# Patient Record
Sex: MALE | Race: WHITE | NOT HISPANIC OR LATINO | Employment: FULL TIME | URBAN - METROPOLITAN AREA
[De-identification: names, ages, dates, MRNs, and addresses within clinical notes are randomized per-mention and may not be internally consistent; named-entity substitution may affect disease eponyms.]

---

## 2019-02-15 PROBLEM — Z80.0 FAMILY HISTORY OF COLON CANCER: Status: ACTIVE | Noted: 2019-02-15

## 2023-04-04 ENCOUNTER — TELEPHONE (OUTPATIENT)
Dept: HEMATOLOGY/ONCOLOGY | Facility: CLINIC | Age: 68
End: 2023-04-04
Payer: COMMERCIAL

## 2023-04-04 NOTE — NURSING
Patient notified of the scheduled appointment with Dr. Joiner for  04/06/23.  Patient will bring images on a disc to appointment.  Referring provider notified of the scheduled appointment.  Records uploaded to .  Provided patient with my call back information.  Oncology Navigation   Intake  Cancer Type: Thoracic  Internal / External Referral: External  Date of Referral: 03/30/23  Initial Nurse Navigator Contact: 04/04/23  Referral to Initial Contact Timeline (days): 5  Date Worked: 04/04/23  First Appointment Available: 04/06/23  Appointment Date: 04/06/23  First Available Date vs. Scheduled Date (days): 0  Multiple appointments: No  Reason if booked > 7 days after scheduling: Specific provider / access     Treatment  Current Status: Active    Surgery: Planned  Surgical Oncologist: Marisel  Consult Date: 04/06/23          Procedures: CT; PET scan  PET Scan Schedule Date: 03/01/23                 Acuity  Stage: 1   Needed: 0  Verbalizes Financial Concerns: 0  Transportation: 0  History of noncompliance/frequent no shows and cancellations: 0  Verbalizes the need for more education: 1  Navigation Acuity: 3     Follow Up  No follow-ups on file.

## 2023-04-06 ENCOUNTER — OFFICE VISIT (OUTPATIENT)
Dept: CARDIOTHORACIC SURGERY | Facility: CLINIC | Age: 68
End: 2023-04-06
Payer: COMMERCIAL

## 2023-04-06 VITALS
OXYGEN SATURATION: 91 % | HEIGHT: 74 IN | SYSTOLIC BLOOD PRESSURE: 142 MMHG | BODY MASS INDEX: 24.67 KG/M2 | WEIGHT: 192.25 LBS | DIASTOLIC BLOOD PRESSURE: 81 MMHG | HEART RATE: 66 BPM

## 2023-04-06 DIAGNOSIS — R91.1 LUNG NODULE: Primary | ICD-10-CM

## 2023-04-06 PROCEDURE — 3077F PR MOST RECENT SYSTOLIC BLOOD PRESSURE >= 140 MM HG: ICD-10-PCS | Mod: CPTII,S$GLB,, | Performed by: STUDENT IN AN ORGANIZED HEALTH CARE EDUCATION/TRAINING PROGRAM

## 2023-04-06 PROCEDURE — 1126F PR PAIN SEVERITY QUANTIFIED, NO PAIN PRESENT: ICD-10-PCS | Mod: CPTII,S$GLB,, | Performed by: STUDENT IN AN ORGANIZED HEALTH CARE EDUCATION/TRAINING PROGRAM

## 2023-04-06 PROCEDURE — 4010F PR ACE/ARB THEARPY RXD/TAKEN: ICD-10-PCS | Mod: CPTII,S$GLB,, | Performed by: STUDENT IN AN ORGANIZED HEALTH CARE EDUCATION/TRAINING PROGRAM

## 2023-04-06 PROCEDURE — 99999 PR PBB SHADOW E&M-EST. PATIENT-LVL III: CPT | Mod: PBBFAC,,, | Performed by: STUDENT IN AN ORGANIZED HEALTH CARE EDUCATION/TRAINING PROGRAM

## 2023-04-06 PROCEDURE — 4010F ACE/ARB THERAPY RXD/TAKEN: CPT | Mod: CPTII,S$GLB,, | Performed by: STUDENT IN AN ORGANIZED HEALTH CARE EDUCATION/TRAINING PROGRAM

## 2023-04-06 PROCEDURE — 3008F PR BODY MASS INDEX (BMI) DOCUMENTED: ICD-10-PCS | Mod: CPTII,S$GLB,, | Performed by: STUDENT IN AN ORGANIZED HEALTH CARE EDUCATION/TRAINING PROGRAM

## 2023-04-06 PROCEDURE — 3079F DIAST BP 80-89 MM HG: CPT | Mod: CPTII,S$GLB,, | Performed by: STUDENT IN AN ORGANIZED HEALTH CARE EDUCATION/TRAINING PROGRAM

## 2023-04-06 PROCEDURE — 1126F AMNT PAIN NOTED NONE PRSNT: CPT | Mod: CPTII,S$GLB,, | Performed by: STUDENT IN AN ORGANIZED HEALTH CARE EDUCATION/TRAINING PROGRAM

## 2023-04-06 PROCEDURE — 3288F FALL RISK ASSESSMENT DOCD: CPT | Mod: CPTII,S$GLB,, | Performed by: STUDENT IN AN ORGANIZED HEALTH CARE EDUCATION/TRAINING PROGRAM

## 2023-04-06 PROCEDURE — 3077F SYST BP >= 140 MM HG: CPT | Mod: CPTII,S$GLB,, | Performed by: STUDENT IN AN ORGANIZED HEALTH CARE EDUCATION/TRAINING PROGRAM

## 2023-04-06 PROCEDURE — 99999 PR PBB SHADOW E&M-EST. PATIENT-LVL III: ICD-10-PCS | Mod: PBBFAC,,, | Performed by: STUDENT IN AN ORGANIZED HEALTH CARE EDUCATION/TRAINING PROGRAM

## 2023-04-06 PROCEDURE — 1159F MED LIST DOCD IN RCRD: CPT | Mod: CPTII,S$GLB,, | Performed by: STUDENT IN AN ORGANIZED HEALTH CARE EDUCATION/TRAINING PROGRAM

## 2023-04-06 PROCEDURE — 3008F BODY MASS INDEX DOCD: CPT | Mod: CPTII,S$GLB,, | Performed by: STUDENT IN AN ORGANIZED HEALTH CARE EDUCATION/TRAINING PROGRAM

## 2023-04-06 PROCEDURE — 99205 OFFICE O/P NEW HI 60 MIN: CPT | Mod: S$GLB,,, | Performed by: STUDENT IN AN ORGANIZED HEALTH CARE EDUCATION/TRAINING PROGRAM

## 2023-04-06 PROCEDURE — 1159F PR MEDICATION LIST DOCUMENTED IN MEDICAL RECORD: ICD-10-PCS | Mod: CPTII,S$GLB,, | Performed by: STUDENT IN AN ORGANIZED HEALTH CARE EDUCATION/TRAINING PROGRAM

## 2023-04-06 PROCEDURE — 1101F PR PT FALLS ASSESS DOC 0-1 FALLS W/OUT INJ PAST YR: ICD-10-PCS | Mod: CPTII,S$GLB,, | Performed by: STUDENT IN AN ORGANIZED HEALTH CARE EDUCATION/TRAINING PROGRAM

## 2023-04-06 PROCEDURE — 3079F PR MOST RECENT DIASTOLIC BLOOD PRESSURE 80-89 MM HG: ICD-10-PCS | Mod: CPTII,S$GLB,, | Performed by: STUDENT IN AN ORGANIZED HEALTH CARE EDUCATION/TRAINING PROGRAM

## 2023-04-06 PROCEDURE — 3288F PR FALLS RISK ASSESSMENT DOCUMENTED: ICD-10-PCS | Mod: CPTII,S$GLB,, | Performed by: STUDENT IN AN ORGANIZED HEALTH CARE EDUCATION/TRAINING PROGRAM

## 2023-04-06 PROCEDURE — 1101F PT FALLS ASSESS-DOCD LE1/YR: CPT | Mod: CPTII,S$GLB,, | Performed by: STUDENT IN AN ORGANIZED HEALTH CARE EDUCATION/TRAINING PROGRAM

## 2023-04-06 PROCEDURE — 99205 PR OFFICE/OUTPT VISIT, NEW, LEVL V, 60-74 MIN: ICD-10-PCS | Mod: S$GLB,,, | Performed by: STUDENT IN AN ORGANIZED HEALTH CARE EDUCATION/TRAINING PROGRAM

## 2023-04-06 RX ORDER — MECLIZINE HYDROCHLORIDE 25 MG/1
TABLET ORAL
COMMUNITY

## 2023-04-06 RX ORDER — ONDANSETRON 4 MG/1
4 TABLET, ORALLY DISINTEGRATING ORAL ONCE
COMMUNITY

## 2023-04-06 RX ORDER — LOSARTAN POTASSIUM 100 MG/1
100 TABLET ORAL DAILY
COMMUNITY

## 2023-04-06 RX ORDER — LORAZEPAM 1 MG/1
TABLET ORAL
COMMUNITY

## 2023-04-06 RX ORDER — METOPROLOL SUCCINATE 25 MG/1
25 TABLET, EXTENDED RELEASE ORAL DAILY
COMMUNITY

## 2023-04-06 NOTE — PROGRESS NOTES
History & Physical    SUBJECTIVE:     History of Present Illness:  Patient is a 67 y.o. male former smoker with HTN, HLD, GERD, and JOSELINE on CPAP here today for evaluation of hilar left lower lobe NSCLC. Underwent screening for AAA which prompted CTA. CTA in Feb showed 3.9 cm left hilar mass originating from LLL. Bronchoscopy with biopsy showed poorly differentiated carcinoma. PET with SUV 16.7. Active job.     Former smoker. Quit 40 years ago.   PSH: loop recorder placement  Works as       Chief Complaint   Patient presents with    Consult     Lung Mass       Review of patient's allergies indicates:   Allergen Reactions    Daypro [oxaprozin]        Current Outpatient Medications   Medication Sig Dispense Refill    aspirin (ECOTRIN) 81 MG EC tablet Take 81 mg by mouth once daily.      atorvastatin (LIPITOR) 40 MG tablet Take 40 mg by mouth once daily.      fish oil-omega-3 fatty acids 300-1,000 mg capsule Take 2 capsules by mouth once daily.      LORazepam (ATIVAN) 1 MG tablet lorazepam 1 mg tablet   TAKE 1/2 TO 1 TABLET BY MOUTH EVERY 8 HOURS AS NEEDED      losartan (COZAAR) 100 MG tablet Take 100 mg by mouth once daily.      meclizine (ANTIVERT) 25 mg tablet meclizine 25 mg tablet   TAKE 1 TABLET BY MOUTH THREE TIMES A DAY AS NEEDED FOR DIZZINESS      metoprolol succinate (TOPROL-XL) 25 MG 24 hr tablet Take 25 mg by mouth once daily.      ondansetron (ZOFRAN-ODT) 4 MG TbDL Take 4 mg by mouth once.      vitamin D (VITAMIN D3) 1000 units Tab Take 2,000 Units by mouth once daily.      atenolol-chlorthalidone (TENORETIC) 100-25 mg per tablet Take 1 tablet by mouth once daily.      losartan (COZAAR) 50 MG tablet Take 50 mg by mouth once daily.       No current facility-administered medications for this visit.       Past Medical History:   Diagnosis Date    Family history of colon cancer     GERD (gastroesophageal reflux disease)     Hypertension      Past Surgical History:   Procedure Laterality Date     "COLONOSCOPY N/A 2/15/2019    Procedure: COLONOSCOPY;  Surgeon: Demond Ortega MD;  Location: [facility];  Service: Endoscopy;  Laterality: N/A;     Family History   Problem Relation Age of Onset    Hypertension Mother     Diabetes Mother     Cancer Father         colon     Social History     Tobacco Use    Smoking status: Never    Smokeless tobacco: Never   Substance Use Topics    Alcohol use: Yes     Comment: occ    Drug use: No        Review of Systems:  Review of Systems   Constitutional:  Negative for activity change, appetite change, fatigue and fever.   Respiratory:  Negative for cough and shortness of breath.    Genitourinary:  Negative for difficulty urinating.   Musculoskeletal:  Negative for arthralgias.   Skin:  Negative for color change and rash.   Neurological:  Negative for dizziness and speech difficulty.   Psychiatric/Behavioral:  Negative for agitation. The patient is not nervous/anxious.      OBJECTIVE:     Vital Signs (Most Recent)  Vitals:    04/06/23 0854   BP: (!) 142/81   Pulse: 66   SpO2: (!) 91%   Weight: 87.2 kg (192 lb 3.9 oz)   Height: 6' 2" (1.88 m)   PainSc: 0-No pain       Physical Exam:  Physical Exam  Constitutional:       Appearance: Normal appearance.   HENT:      Head: Atraumatic.   Eyes:      Extraocular Movements: Extraocular movements intact.   Cardiovascular:      Rate and Rhythm: Normal rate and regular rhythm.      Pulses: Normal pulses.      Heart sounds: Normal heart sounds.   Pulmonary:      Effort: Pulmonary effort is normal.      Breath sounds: Normal breath sounds.   Abdominal:      Palpations: Abdomen is soft.   Musculoskeletal:         General: Normal range of motion.      Cervical back: Normal range of motion and neck supple.      Right lower leg: No edema.      Left lower leg: No edema.   Skin:     General: Skin is warm and dry.   Neurological:      General: No focal deficit present.      Mental Status: He is alert and oriented to person, place, and time. "   Psychiatric:         Mood and Affect: Mood normal.         Behavior: Behavior normal.     Outside PET CT images reviewed     Feb chest CTA: images reviewed left hilar mass originating in LLL with abutment to PA     PFTS:   FEV1: 1.89L 49.3%  DLCO: 15.60 49.4%    ASSESSMENT/PLAN:     Patient is a 67 y.o. male former smoker with HTN, HLD, GERD, and JOSELINE on CPAP here today for evaluation of hilar left lower lobe NSCLC.   Left hilar tumor. No evidence of mediastinal lymphadenopathy on PET. Patient is a marginal surgical candidate from a pulmonary standpoint with FEV1 and DLCO <50. In addition,  although this is a cStage IB cancer (T2a,N0, Mx) the mass is intimately involved with the pulmonary artery and may require a pneumonectomy vs extended left lower lobectomy    PLAN:Plan     Discuss in tumor board next week.   Marginal PFTs - will obtain 6MWT.   Will call patient following conference with recommendations.

## 2023-04-10 ENCOUNTER — HOSPITAL ENCOUNTER (OUTPATIENT)
Dept: PULMONOLOGY | Facility: HOSPITAL | Age: 68
Discharge: HOME | End: 2023-04-10
Attending: PSYCHIATRY & NEUROLOGY
Payer: COMMERCIAL

## 2023-04-10 DIAGNOSIS — R91.1 LUNG NODULE: ICD-10-CM

## 2023-04-10 NOTE — RESPIRATORY THERAPY
Home Oxygen Evaluation    Date Performed: 4/10/2023    1) Patient's Home O2 Sat on room air, while at rest: 98%        If O2 sats on room air at rest are 88% or below, patient qualifies. No additional testing needed. Document N/A in steps 2 and 3. If 89% or above, complete steps 2.      2) Patient's O2 Sat on room air while exercisin%        If O2 sats on room air while exercising remain 89% or above patient does not qualify, no further testing needed Document N/A in step 3. If O2 sats on room air while exercising are 88% or below, continue to step 3.      3) Patient's O2 Sat while exercising on O2:  at  LPM         (Must show improvement from #2 for patients to qualify)    If O2 sats improve on oxygen, patient qualifies for portable oxygen. If not, the patient does not qualify.

## 2023-04-12 DIAGNOSIS — Z12.9 SCREENING FOR CANCER: ICD-10-CM

## 2023-04-14 ENCOUNTER — TELEPHONE (OUTPATIENT)
Dept: HEMATOLOGY/ONCOLOGY | Facility: CLINIC | Age: 68
End: 2023-04-14
Payer: COMMERCIAL

## 2023-04-14 NOTE — NURSING
Spoke with Mr. Bello.  Notified of the scheduled Consultation with Dr. Fernandez for 4/19/23.  Patient also notified of the scheduled brain MRI and CT Chest with IV Contrast.  NPO instructions given.    Oncology Navigation   Intake  Cancer Type: Thoracic  Internal / External Referral: External  Date of Referral: 03/30/23  Initial Nurse Navigator Contact: 04/04/23  Referral to Initial Contact Timeline (days): 5  Date Worked: 04/14/23  First Appointment Available: 04/06/23  Appointment Date: 04/06/23  First Available Date vs. Scheduled Date (days): 0  Multiple appointments: No  Reason if booked > 7 days after scheduling: Specific provider / access     Treatment  Current Status: Active  Date Presented to Tumor Board: 04/12/23  Presentation Notes: Repeat PFTs and 6MWT. Needs updated imaging with chest CT with IV contrast. EBUS for staging based on proximal tumor and size. Brain MRI to complete staging.      Tumor size, location, and noel status to determine upfront surgery vs neoadjuvant followed by surgery vs definitive chemoRT.    Surgery: Planned  Surgical Oncologist: Marisel  Consult Date: 04/06/23          Procedures: CT; MRI  CT Schedule Date: 04/19/23  MRI Schedule Date: 04/19/23  PET Scan Schedule Date: 03/01/23             Support Systems: Children  Barriers of Care: Transportation     Acuity  Stage: 1   Needed: 0  Verbalizes Financial Concerns: 0  Transportation: 0  History of noncompliance/frequent no shows and cancellations: 0  Verbalizes the need for more education: 1  Navigation Acuity: 3     Follow Up  No follow-ups on file.

## 2023-04-19 ENCOUNTER — HOSPITAL ENCOUNTER (OUTPATIENT)
Dept: RADIOLOGY | Facility: HOSPITAL | Age: 68
Discharge: HOME | End: 2023-04-19
Attending: PHYSICIAN ASSISTANT
Payer: COMMERCIAL

## 2023-04-19 ENCOUNTER — OFFICE VISIT (OUTPATIENT)
Dept: PULMONOLOGY | Facility: CLINIC | Age: 68
End: 2023-04-19
Payer: COMMERCIAL

## 2023-04-19 VITALS
DIASTOLIC BLOOD PRESSURE: 65 MMHG | SYSTOLIC BLOOD PRESSURE: 126 MMHG | BODY MASS INDEX: 24.67 KG/M2 | WEIGHT: 192.25 LBS | HEART RATE: 74 BPM | HEIGHT: 74 IN | TEMPERATURE: 98 F | OXYGEN SATURATION: 96 %

## 2023-04-19 DIAGNOSIS — R91.8 LUNG MASS: Primary | ICD-10-CM

## 2023-04-19 DIAGNOSIS — Z12.9 SCREENING FOR CANCER: ICD-10-CM

## 2023-04-19 PROCEDURE — 1160F RVW MEDS BY RX/DR IN RCRD: CPT | Mod: CPTII,S$GLB,, | Performed by: EMERGENCY MEDICINE

## 2023-04-19 PROCEDURE — 3078F PR MOST RECENT DIASTOLIC BLOOD PRESSURE < 80 MM HG: ICD-10-PCS | Mod: CPTII,S$GLB,, | Performed by: EMERGENCY MEDICINE

## 2023-04-19 PROCEDURE — 4010F PR ACE/ARB THEARPY RXD/TAKEN: ICD-10-PCS | Mod: CPTII,S$GLB,, | Performed by: EMERGENCY MEDICINE

## 2023-04-19 PROCEDURE — 71260 CT THORAX DX C+: CPT | Mod: TC

## 2023-04-19 PROCEDURE — 99204 PR OFFICE/OUTPT VISIT, NEW, LEVL IV, 45-59 MIN: ICD-10-PCS | Mod: S$GLB,,, | Performed by: EMERGENCY MEDICINE

## 2023-04-19 PROCEDURE — 1159F MED LIST DOCD IN RCRD: CPT | Mod: CPTII,S$GLB,, | Performed by: EMERGENCY MEDICINE

## 2023-04-19 PROCEDURE — 1160F PR REVIEW ALL MEDS BY PRESCRIBER/CLIN PHARMACIST DOCUMENTED: ICD-10-PCS | Mod: CPTII,S$GLB,, | Performed by: EMERGENCY MEDICINE

## 2023-04-19 PROCEDURE — 70553 MRI BRAIN STEM W/O & W/DYE: CPT | Mod: TC

## 2023-04-19 PROCEDURE — 1126F AMNT PAIN NOTED NONE PRSNT: CPT | Mod: CPTII,S$GLB,, | Performed by: EMERGENCY MEDICINE

## 2023-04-19 PROCEDURE — 25500020 PHARM REV CODE 255: Performed by: PHYSICIAN ASSISTANT

## 2023-04-19 PROCEDURE — 99999 PR PBB SHADOW E&M-EST. PATIENT-LVL V: CPT | Mod: PBBFAC,,, | Performed by: EMERGENCY MEDICINE

## 2023-04-19 PROCEDURE — 1126F PR PAIN SEVERITY QUANTIFIED, NO PAIN PRESENT: ICD-10-PCS | Mod: CPTII,S$GLB,, | Performed by: EMERGENCY MEDICINE

## 2023-04-19 PROCEDURE — 3074F SYST BP LT 130 MM HG: CPT | Mod: CPTII,S$GLB,, | Performed by: EMERGENCY MEDICINE

## 2023-04-19 PROCEDURE — 99999 PR PBB SHADOW E&M-EST. PATIENT-LVL V: ICD-10-PCS | Mod: PBBFAC,,, | Performed by: EMERGENCY MEDICINE

## 2023-04-19 PROCEDURE — 3074F PR MOST RECENT SYSTOLIC BLOOD PRESSURE < 130 MM HG: ICD-10-PCS | Mod: CPTII,S$GLB,, | Performed by: EMERGENCY MEDICINE

## 2023-04-19 PROCEDURE — A9585 GADOBUTROL INJECTION: HCPCS | Performed by: PHYSICIAN ASSISTANT

## 2023-04-19 PROCEDURE — 70553 MRI BRAIN W WO CONTRAST: ICD-10-PCS | Mod: 26,,, | Performed by: RADIOLOGY

## 2023-04-19 PROCEDURE — 71260 CT THORAX DX C+: CPT | Mod: 26,,, | Performed by: RADIOLOGY

## 2023-04-19 PROCEDURE — 99204 OFFICE O/P NEW MOD 45 MIN: CPT | Mod: S$GLB,,, | Performed by: EMERGENCY MEDICINE

## 2023-04-19 PROCEDURE — 3008F PR BODY MASS INDEX (BMI) DOCUMENTED: ICD-10-PCS | Mod: CPTII,S$GLB,, | Performed by: EMERGENCY MEDICINE

## 2023-04-19 PROCEDURE — 3078F DIAST BP <80 MM HG: CPT | Mod: CPTII,S$GLB,, | Performed by: EMERGENCY MEDICINE

## 2023-04-19 PROCEDURE — 1159F PR MEDICATION LIST DOCUMENTED IN MEDICAL RECORD: ICD-10-PCS | Mod: CPTII,S$GLB,, | Performed by: EMERGENCY MEDICINE

## 2023-04-19 PROCEDURE — 4010F ACE/ARB THERAPY RXD/TAKEN: CPT | Mod: CPTII,S$GLB,, | Performed by: EMERGENCY MEDICINE

## 2023-04-19 PROCEDURE — 3008F BODY MASS INDEX DOCD: CPT | Mod: CPTII,S$GLB,, | Performed by: EMERGENCY MEDICINE

## 2023-04-19 PROCEDURE — 70553 MRI BRAIN STEM W/O & W/DYE: CPT | Mod: 26,,, | Performed by: RADIOLOGY

## 2023-04-19 PROCEDURE — 71260 CT CHEST WITH CONTRAST: ICD-10-PCS | Mod: 26,,, | Performed by: RADIOLOGY

## 2023-04-19 RX ORDER — GADOBUTROL 604.72 MG/ML
8 INJECTION INTRAVENOUS
Status: COMPLETED | OUTPATIENT
Start: 2023-04-19 | End: 2023-04-19

## 2023-04-19 RX ADMIN — IOHEXOL 100 ML: 350 INJECTION, SOLUTION INTRAVENOUS at 02:04

## 2023-04-19 RX ADMIN — GADOBUTROL 10 ML: 604.72 INJECTION INTRAVENOUS at 02:04

## 2023-04-19 NOTE — PROGRESS NOTES
Pulmonary & Critical Care Medicine   Consultation Note    Reason for Consultation:EBUS     HPI: Referral from Dr. Joiner- 67 y.o. male former smoker with HTN, HLD, GERD, and JOSELINE on CPAP here today for evaluation of hilar left lower lobe NSCLC. Underwent screening for AAA which prompted CTA. CTA in Feb showed 3.9 cm left hilar mass originating from LLL. Bronchoscopy with biopsy showed poorly differentiated carcinoma. PET with SUV 16.7. Active job.      Presented at tumor board- BOARD RECOMMENDATIONS:   Repeat PFTs and 6MWT. Needs updated imaging with chest CT with IV contrast. EBUS for staging based on proximal tumor and size. Brain MRI to complete staging.     No breathing issues- AM cough-- Uses CPAP.. JOSELINE.. Occasional sputum..       Additional Pulmonary History:   Occupational/Environmental Exposures:From [place].. Moved in NOV. No renovations/environmental issues. Works as - 40years.. Still working.. Has been around several chemical spills.. Lots of kinds..    Exposure to Animals/Pets: None   Travel History: None..   History of exposures to TB: None    Family History of Lung Cancer: None   Childhood history of Lung Disease: None   Tobacco history: 10 pk years-- Quit in 80's  Chest surgery/trauma- Has loop recorder. Nothing additional   IS- None   OAC/Anti-PLT-ASA 81mg.    Recurrent PNA- None   No issues with anesthesia in past.   Plan to keep monitoring AAA>     Past Medical History:   Diagnosis Date    Family history of colon cancer     GERD (gastroesophageal reflux disease)     Hypertension      Past Surgical History:   Procedure Laterality Date    COLONOSCOPY N/A 2/15/2019    Procedure: COLONOSCOPY;  Surgeon: Demond Ortega MD;  Location: [facility];  Service: Endoscopy;  Laterality: N/A;     Social History:   Social History     Socioeconomic History    Marital status:    Tobacco Use    Smoking status: Never    Smokeless tobacco: Never   Substance and Sexual Activity    Alcohol use: Yes  "    Comment: occ    Drug use: No     Family History   Problem Relation Age of Onset    Hypertension Mother     Diabetes Mother     Cancer Father         colon     Drug Allergies:   Review of patient's allergies indicates:   Allergen Reactions    Daypro [oxaprozin]          Review of Systems:   A comprehensive 12-point review of systems was performed, and is negative except for those items mentioned above in the HPI section of this note.     Vital Signs:  /65 (BP Location: Right arm, Patient Position: Sitting, BP Method: Medium (Automatic))   Pulse 74   Temp 98 °F (36.7 °C) (Oral)   Ht 6' 2" (1.88 m)   Wt 87.2 kg (192 lb 3.9 oz)   SpO2 96%   BMI 24.68 kg/m²        Physical Exam:   GEN- NAD AAOx3 Well Built, Well Appearing   HEENT- ATNC, PERRLA, EOMI, OP-Cl. No JVD, LAD or bruit noted. Trachea Midline.   CV- RRR No M/R/G  RESP- CTA-Bilateral   GI- S/NT/ND. Positive BS X 4. No HSM Noted  BACK- Spine midline. No step off, crepitus or deformity noted. No midline TTP.   Ext- MAEW, No deformity. No edema or rashes noted.   Neuro- Strength 5/5 symmetric. CN 2-12 intact. Normal gait. Normal sensation.      Personal Review and Summary of Prior Diagnostics    Laboratory Studies: Reviewed   No labs in system since 2020     Microbiology Data: Reviewed.     Summary of Chest Imaging Personally Reviewed:     CT Chest- 3.5cm LLL mass. No enlarged LN noted    2D Echo: 2020-   · Normal left ventricle cavity size. Normal wall thickness. Normal   (55-65%) ejection fraction. Normal left ventricle diastolic function.   · Normal right ventricle cavity size and ejection fraction. TAPSE: 2.6     Left Ventricle   Normal left ventricle cavity size. Normal wall thickness. Normal (55-65%) ejection fraction. Normal left ventricle diastolic function.     Right Ventricle   Normal right ventricle cavity size and ejection fraction.   TAPSE: 2.6     Left Atrium   Normal cavity size. Volume is normal.     Right Atrium   Normal cavity size. "     IVC/SVC   Normal central venous pressure (0-5mm Hg).     PFT's: [facility]-    FEV1: 1.89L 49.3% DLCO: 15.60 49.4%        Assessment:     No diagnosis found.     Outpatient Encounter Medications as of 4/19/2023   Medication Sig Dispense Refill    aspirin (ECOTRIN) 81 MG EC tablet Take 81 mg by mouth once daily.      atenolol-chlorthalidone (TENORETIC) 100-25 mg per tablet Take 1 tablet by mouth once daily.      atorvastatin (LIPITOR) 40 MG tablet Take 40 mg by mouth once daily.      fish oil-omega-3 fatty acids 300-1,000 mg capsule Take 2 capsules by mouth once daily.      LORazepam (ATIVAN) 1 MG tablet lorazepam 1 mg tablet   TAKE 1/2 TO 1 TABLET BY MOUTH EVERY 8 HOURS AS NEEDED      losartan (COZAAR) 100 MG tablet Take 100 mg by mouth once daily.      losartan (COZAAR) 50 MG tablet Take 50 mg by mouth once daily.      meclizine (ANTIVERT) 25 mg tablet meclizine 25 mg tablet   TAKE 1 TABLET BY MOUTH THREE TIMES A DAY AS NEEDED FOR DIZZINESS      metoprolol succinate (TOPROL-XL) 25 MG 24 hr tablet Take 25 mg by mouth once daily.      ondansetron (ZOFRAN-ODT) 4 MG TbDL Take 4 mg by mouth once.      vitamin D (VITAMIN D3) 1000 units Tab Take 2,000 Units by mouth once daily.       No facility-administered encounter medications on file as of 4/19/2023.     No orders of the defined types were placed in this encounter.      Plan:     Left lower lobe NSCLC (Poorly differentiated)   History of tobacco use     Plan for staging EBUS given proximal mass- No mediastinal or hilar adenopathy by NM PET or CT.     -- Case request placed for 5/12  -- Hold ASA 81 5 days prior.     Discussed procedure with him in detail. Risks including bleeding, PTX, respiratory failure and numerous additional potential complications up to death outlined. He verbalized understanding and all questions answered to his satisfaction. Written consent signed and on chart.     Vish Fernandez MD   [facility]

## 2023-04-26 DIAGNOSIS — C34.90 NON-SMALL CELL LUNG CANCER, UNSPECIFIED LATERALITY: Primary | ICD-10-CM

## 2023-04-26 DIAGNOSIS — C34.32 MALIGNANT NEOPLASM OF LOWER LOBE OF LEFT LUNG: ICD-10-CM

## 2023-04-28 ENCOUNTER — TELEPHONE (OUTPATIENT)
Dept: PULMONOLOGY | Facility: CLINIC | Age: 68
End: 2023-04-28
Payer: COMMERCIAL

## 2023-04-28 NOTE — TELEPHONE ENCOUNTER
----- Message from Alena Sullivan sent at 4/28/2023  2:25 PM CDT -----  Contact: @[phone]  Caller daughter is calling in to see why the pt needs a Ct scan , please call to discuss further.

## 2023-04-28 NOTE — TELEPHONE ENCOUNTER
Patient daughter is calling to find out why her dad still needs a PET scan is he is nota a candidate for surgery. Patients daughter states on the patient portal the test was order by Dr. murillo